# Patient Record
Sex: FEMALE | Race: OTHER | HISPANIC OR LATINO | Employment: STUDENT | URBAN - METROPOLITAN AREA
[De-identification: names, ages, dates, MRNs, and addresses within clinical notes are randomized per-mention and may not be internally consistent; named-entity substitution may affect disease eponyms.]

---
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
## 2024-10-30 ENCOUNTER — HOSPITAL ENCOUNTER (EMERGENCY)
Facility: HOSPITAL | Age: 19
Discharge: HOME | End: 2024-10-30
Attending: EMERGENCY MEDICINE | Admitting: EMERGENCY MEDICINE
Payer: COMMERCIAL

## 2024-10-30 VITALS
HEART RATE: 80 BPM | WEIGHT: 156.75 LBS | DIASTOLIC BLOOD PRESSURE: 79 MMHG | RESPIRATION RATE: 18 BRPM | OXYGEN SATURATION: 98 % | SYSTOLIC BLOOD PRESSURE: 141 MMHG | TEMPERATURE: 98.1 F

## 2024-10-30 DIAGNOSIS — K92.1 BLOOD IN STOOL: Primary | ICD-10-CM

## 2024-10-30 PROCEDURE — 99283 EMERGENCY DEPT VISIT LOW MDM: CPT | Performed by: EMERGENCY MEDICINE

## 2024-10-30 PROCEDURE — 99284 EMERGENCY DEPT VISIT MOD MDM: CPT

## 2024-10-31 NOTE — ED PROVIDER NOTES
"Time reflects when diagnosis was documented in both MDM as applicable and the Disposition within this note       Time User Action Codes Description Comment    10/30/2024 10:43 PM Haresh Aponte Add [K92.1] Blood in stool           ED Disposition       ED Disposition   Discharge    Condition   Stable    Date/Time   Wed Oct 30, 2024 10:43 PM    Comment   Winnie Quezada discharge to home/self care.                   Assessment & Plan       Medical Decision Making  19-year-old female presents with blood in stool  Patient with stable vitals no signs of anemia per history as well as exam  Patient also without any abdominal pain not concerning for any pancreatitis, hepatitis, cholecystitis, appendicitis  Will refer patient to GI for possible colonoscopy    Problems Addressed:  Blood in stool: acute illness or injury             Medications - No data to display    ED Risk Strat Scores             CRAFFT      Flowsheet Row Most Recent Value   CRAFFT Initial Screen: During the past 12 months, did you:    1. Drink any alcohol (more than a few sips)?  Yes Filed at: 10/30/2024 2236   2. Smoke any marijuana or hashish No Filed at: 10/30/2024 2236   3. Use anything else to get high? (\"anything else\" includes illegal drugs, over the counter and prescription drugs, and things that you sniff or 'christian')? No Filed at: 10/30/2024 2236   CRAFFT Full Screen: During the past 12 months:    1. Have you ever ridden in a car driven by someone (including yourself) who was \"high\" or had been using alcohol or drugs? 0 Filed at: 10/30/2024 2236   2. Do you ever use alcohol or drugs to relax, feel better about yourself, or fit in? 0 Filed at: 10/30/2024 2236   3. Do you ever use alcohol/drugs while you are by yourself, alone? 0 Filed at: 10/30/2024 2236   4. Do you ever forget things you did while using alcohol or drugs? 0 Filed at: 10/30/2024 2236   5. Do your family or friends ever tell you that you should cut down on your drinking or drug " "use? 0 Filed at: 10/30/2024 2236   6. Have you gotten into trouble while you were using alcohol or drugs? 0 Filed at: 10/30/2024 2236   CRAFFT Score 0 Filed at: 10/30/2024 2236                                          History of Present Illness       Chief Complaint   Patient presents with    Black or Bloody Stool     Pt states \"I'm having the same problem as last time, they told me I have hemorrhoids inside, and it's been about a month, and I had called my doctor and she told me if it keeps going on to go to the ER.\" Pt reports black stool, and pain after bowel movements.       History reviewed. No pertinent past medical history.   Past Surgical History:   Procedure Laterality Date    TONSILLECTOMY        History reviewed. No pertinent family history.   Social History     Tobacco Use    Smoking status: Some Days     Passive exposure: Yes    Smokeless tobacco: Never    Tobacco comments:     hookah   Substance Use Topics    Alcohol use: Yes     Comment: less than monthly 1-2    Drug use: Never      E-Cigarette/Vaping      E-Cigarette/Vaping Substances      I have reviewed and agree with the history as documented.     HPI  19-year-old female with history of hemorrhoids presenting with bloody stool.  States that she has noted some blood in her stool for the past month or so.  When she talked to her primary care provider she was told to go to the emergency department for evaluation.  Patient denies any lightheadedness, chest pain, shortness of breath.  Patient also reports no weakness, numbness.  Bleeding has not been copious.  Reports no abdominal pain.    Review of Systems   Constitutional:  Negative for chills, diaphoresis, fever and unexpected weight change.   HENT:  Negative for ear pain and sore throat.    Eyes:  Negative for visual disturbance.   Respiratory:  Negative for cough, chest tightness and shortness of breath.    Cardiovascular:  Negative for chest pain and leg swelling.   Gastrointestinal:  Positive " for blood in stool. Negative for abdominal distention, abdominal pain, constipation, diarrhea, nausea and vomiting.   Endocrine: Negative.    Genitourinary:  Negative for difficulty urinating and dysuria.   Musculoskeletal: Negative.    Skin: Negative.    Allergic/Immunologic: Negative.    Neurological: Negative.    Hematological: Negative.    Psychiatric/Behavioral: Negative.     All other systems reviewed and are negative.          Objective       ED Triage Vitals [10/30/24 2234]   Temperature Pulse Blood Pressure Respirations SpO2 Patient Position - Orthostatic VS   98.1 °F (36.7 °C) 80 141/79 18 98 % Sitting      Temp Source Heart Rate Source BP Location FiO2 (%) Pain Score    Oral -- Left arm -- --      Vitals      Date and Time Temp Pulse SpO2 Resp BP Pain Score FACES Pain Rating User   10/30/24 2234 98.1 °F (36.7 °C) 80 98 % 18 141/79 -- -- CO            Physical Exam  Vitals and nursing note reviewed.   Constitutional:       General: She is not in acute distress.     Appearance: Normal appearance. She is not ill-appearing.   HENT:      Head: Normocephalic and atraumatic.      Right Ear: External ear normal.      Left Ear: External ear normal.      Nose: Nose normal.      Mouth/Throat:      Mouth: Mucous membranes are moist.      Pharynx: Oropharynx is clear.   Eyes:      General: No scleral icterus.        Right eye: No discharge.         Left eye: No discharge.      Extraocular Movements: Extraocular movements intact.      Conjunctiva/sclera: Conjunctivae normal.      Pupils: Pupils are equal, round, and reactive to light.   Cardiovascular:      Rate and Rhythm: Normal rate and regular rhythm.      Pulses: Normal pulses.      Heart sounds: Normal heart sounds.   Pulmonary:      Effort: Pulmonary effort is normal.      Breath sounds: Normal breath sounds.   Abdominal:      General: Abdomen is flat. Bowel sounds are normal. There is no distension.      Palpations: Abdomen is soft.      Tenderness: There is no  abdominal tenderness. There is no guarding or rebound.   Musculoskeletal:         General: Normal range of motion.      Cervical back: Normal range of motion and neck supple.   Skin:     General: Skin is warm and dry.      Capillary Refill: Capillary refill takes less than 2 seconds.   Neurological:      General: No focal deficit present.      Mental Status: She is alert and oriented to person, place, and time. Mental status is at baseline.   Psychiatric:         Mood and Affect: Mood normal.         Behavior: Behavior normal.         Thought Content: Thought content normal.         Judgment: Judgment normal.         Results Reviewed       None            No orders to display       Procedures    ED Medication and Procedure Management   Prior to Admission Medications   Prescriptions Last Dose Informant Patient Reported? Taking?   hydrocortisone 1 % cream   No No   Sig: Apply to affected area 2 times daily      Facility-Administered Medications: None     Discharge Medication List as of 10/30/2024 10:44 PM        CONTINUE these medications which have NOT CHANGED    Details   hydrocortisone 1 % cream Apply to affected area 2 times daily, Normal           No discharge procedures on file.  ED SEPSIS DOCUMENTATION   Time reflects when diagnosis was documented in both MDM as applicable and the Disposition within this note       Time User Action Codes Description Comment    10/30/2024 10:43 PM Haresh Aponte Add [K92.1] Blood in stool                  Haresh Aponte MD  10/30/24 0406

## 2025-01-08 ENCOUNTER — HOSPITAL ENCOUNTER (EMERGENCY)
Facility: HOSPITAL | Age: 20
Discharge: HOME | End: 2025-01-08
Attending: EMERGENCY MEDICINE
Payer: COMMERCIAL

## 2025-01-08 VITALS
SYSTOLIC BLOOD PRESSURE: 110 MMHG | HEART RATE: 65 BPM | TEMPERATURE: 97.9 F | WEIGHT: 155.65 LBS | OXYGEN SATURATION: 99 % | DIASTOLIC BLOOD PRESSURE: 65 MMHG | RESPIRATION RATE: 18 BRPM

## 2025-01-08 DIAGNOSIS — N93.9 ABNORMAL VAGINAL BLEEDING: Primary | ICD-10-CM

## 2025-01-08 LAB
ALBUMIN SERPL BCG-MCNC: 4.3 G/DL (ref 3.5–5)
ALP SERPL-CCNC: 56 U/L (ref 34–104)
ALT SERPL W P-5'-P-CCNC: 12 U/L (ref 7–52)
ANION GAP SERPL CALCULATED.3IONS-SCNC: 7 MMOL/L (ref 4–13)
AST SERPL W P-5'-P-CCNC: 15 U/L (ref 13–39)
BACTERIA UR QL AUTO: ABNORMAL /HPF
BASOPHILS # BLD AUTO: 0.02 THOUSANDS/ΜL (ref 0–0.1)
BASOPHILS NFR BLD AUTO: 0 % (ref 0–1)
BILIRUB SERPL-MCNC: 0.4 MG/DL (ref 0.2–1)
BILIRUB UR QL STRIP: NEGATIVE
BUN SERPL-MCNC: 10 MG/DL (ref 5–25)
CALCIUM SERPL-MCNC: 9.3 MG/DL (ref 8.4–10.2)
CHLORIDE SERPL-SCNC: 107 MMOL/L (ref 96–108)
CLARITY UR: CLEAR
CO2 SERPL-SCNC: 26 MMOL/L (ref 21–32)
COLOR UR: ABNORMAL
CREAT SERPL-MCNC: 0.59 MG/DL (ref 0.6–1.3)
EOSINOPHIL # BLD AUTO: 0.06 THOUSAND/ΜL (ref 0–0.61)
EOSINOPHIL NFR BLD AUTO: 1 % (ref 0–6)
ERYTHROCYTE [DISTWIDTH] IN BLOOD BY AUTOMATED COUNT: 12.3 % (ref 11.6–15.1)
EXT PREGNANCY TEST URINE: NEGATIVE
EXT. CONTROL: NORMAL
GFR SERPL CREATININE-BSD FRML MDRD: 131 ML/MIN/1.73SQ M
GLUCOSE SERPL-MCNC: 109 MG/DL (ref 65–140)
GLUCOSE UR STRIP-MCNC: NEGATIVE MG/DL
HCT VFR BLD AUTO: 43.5 % (ref 34.8–46.1)
HGB BLD-MCNC: 14.6 G/DL (ref 11.5–15.4)
HGB UR QL STRIP.AUTO: 250
IMM GRANULOCYTES # BLD AUTO: 0.02 THOUSAND/UL (ref 0–0.2)
IMM GRANULOCYTES NFR BLD AUTO: 0 % (ref 0–2)
KETONES UR STRIP-MCNC: NEGATIVE MG/DL
LEUKOCYTE ESTERASE UR QL STRIP: NEGATIVE
LYMPHOCYTES # BLD AUTO: 2.4 THOUSANDS/ΜL (ref 0.6–4.47)
LYMPHOCYTES NFR BLD AUTO: 36 % (ref 14–44)
MCH RBC QN AUTO: 31.1 PG (ref 26.8–34.3)
MCHC RBC AUTO-ENTMCNC: 33.6 G/DL (ref 31.4–37.4)
MCV RBC AUTO: 93 FL (ref 82–98)
MONOCYTES # BLD AUTO: 0.7 THOUSAND/ΜL (ref 0.17–1.22)
MONOCYTES NFR BLD AUTO: 10 % (ref 4–12)
NEUTROPHILS # BLD AUTO: 3.57 THOUSANDS/ΜL (ref 1.85–7.62)
NEUTS SEG NFR BLD AUTO: 53 % (ref 43–75)
NITRITE UR QL STRIP: NEGATIVE
NON-SQ EPI CELLS URNS QL MICRO: ABNORMAL /HPF
NRBC BLD AUTO-RTO: 0 /100 WBCS
PH UR STRIP.AUTO: 5 [PH]
PLATELET # BLD AUTO: 303 THOUSANDS/UL (ref 149–390)
PMV BLD AUTO: 8.9 FL (ref 8.9–12.7)
POTASSIUM SERPL-SCNC: 3.7 MMOL/L (ref 3.5–5.3)
PROT SERPL-MCNC: 7 G/DL (ref 6.4–8.4)
PROT UR STRIP-MCNC: NEGATIVE MG/DL
RBC # BLD AUTO: 4.7 MILLION/UL (ref 3.81–5.12)
RBC #/AREA URNS AUTO: ABNORMAL /HPF
SODIUM SERPL-SCNC: 140 MMOL/L (ref 135–147)
SP GR UR STRIP.AUTO: 1.01 (ref 1–1.04)
UROBILINOGEN UA: NEGATIVE MG/DL
WBC # BLD AUTO: 6.77 THOUSAND/UL (ref 4.31–10.16)
WBC #/AREA URNS AUTO: ABNORMAL /HPF

## 2025-01-08 PROCEDURE — 99284 EMERGENCY DEPT VISIT MOD MDM: CPT

## 2025-01-08 PROCEDURE — 81001 URINALYSIS AUTO W/SCOPE: CPT | Performed by: PHYSICIAN ASSISTANT

## 2025-01-08 PROCEDURE — 99284 EMERGENCY DEPT VISIT MOD MDM: CPT | Performed by: PHYSICIAN ASSISTANT

## 2025-01-08 PROCEDURE — 36415 COLL VENOUS BLD VENIPUNCTURE: CPT | Performed by: PHYSICIAN ASSISTANT

## 2025-01-08 PROCEDURE — 80053 COMPREHEN METABOLIC PANEL: CPT | Performed by: PHYSICIAN ASSISTANT

## 2025-01-08 PROCEDURE — 85025 COMPLETE CBC W/AUTO DIFF WBC: CPT | Performed by: PHYSICIAN ASSISTANT

## 2025-01-08 PROCEDURE — 81025 URINE PREGNANCY TEST: CPT | Performed by: PHYSICIAN ASSISTANT

## 2025-01-08 RX ORDER — NAPROXEN 500 MG/1
500 TABLET ORAL 2 TIMES DAILY WITH MEALS
Qty: 30 TABLET | Refills: 0 | Status: SHIPPED | OUTPATIENT
Start: 2025-01-08

## 2025-01-08 RX ORDER — NAPROXEN 500 MG/1
500 TABLET ORAL ONCE
Status: COMPLETED | OUTPATIENT
Start: 2025-01-08 | End: 2025-01-08

## 2025-01-08 RX ADMIN — NAPROXEN 500 MG: 500 TABLET ORAL at 01:41

## 2025-01-08 NOTE — ED NOTES
Patient seen by the provider with results of testing and discharge instructions reviewed with patient. Patient reports relief of her pain.     Gertrudis Klein RN  01/08/25 0220

## 2025-01-08 NOTE — DISCHARGE INSTRUCTIONS
Take medications as directed.  Follow-up with GYN.  Call this week to make an appointment.  Return for new or worsening complaints.

## 2025-01-08 NOTE — Clinical Note
Winnie Quezada was seen and treated in our emergency department on 1/8/2025.    No restrictions            Diagnosis:     Winnie  may return to work on return date.    She may return on this date: 01/09/2025         If you have any questions or concerns, please don't hesitate to call.      Mary Bolden PA-C    ______________________________           _______________          _______________  Hospital Representative                              Date                                Time

## 2025-01-08 NOTE — ED PROVIDER NOTES
Time reflects when diagnosis was documented in both MDM as applicable and the Disposition within this note       Time User Action Codes Description Comment    1/8/2025  2:21 AM Mary Bolden Add [N93.9] Abnormal vaginal bleeding           ED Disposition       ED Disposition   Discharge    Condition   Stable    Date/Time   Wed Jan 8, 2025  2:21 AM    Comment   Winnie Quezada discharge to home/self care.                   Assessment & Plan       Medical Decision Making  Patient presenting complaining of vaginal bleeding.  Patient was normotensive on presentation without tachycardia and appeared well.  Moderate bleeding noted on pelvic exam however no profuse or severe bleeding.  Patient had stable hemoglobin at 14.  Pain was completely resolved with naproxen.  Pregnancy test negative.  Low concern for ectopic.  Pain resolved with naproxen, low concern for acute ovarian torsion.  Patient given follow-up with GYN educated on supportive care and return precautions.  Discharged home.    Amount and/or Complexity of Data Reviewed  Labs: ordered.    Risk  Prescription drug management.             Medications   naproxen (NAPROSYN) tablet 500 mg (500 mg Oral Given 1/8/25 0141)       ED Risk Strat Scores                          SBIRT 20yo+      Flowsheet Row Most Recent Value   Initial Alcohol Screen: US AUDIT-C     1. How often do you have a drink containing alcohol? 0 Filed at: 01/08/2025 0112   2. How many drinks containing alcohol do you have on a typical day you are drinking?  0 Filed at: 01/08/2025 0112   3a. Male UNDER 65: How often do you have five or more drinks on one occasion? 0 Filed at: 01/08/2025 0112   3b. FEMALE Any Age, or MALE 65+: How often do you have 4 or more drinks on one occassion? 0 Filed at: 01/08/2025 0112   Audit-C Score 0 Filed at: 01/08/2025 0112   NIKI: How many times in the past year have you...    Used an illegal drug or used a prescription medication for non-medical reasons?  Never Filed at: 01/08/2025 0112                            History of Present Illness       Chief Complaint   Patient presents with    Vaginal Bleeding     Pt reports 4 days of vaginal bleeding with lower abd cramping, changing pad appr. Every 45 minutes.  Pt reports she is appr. 2 weeks late on period.  Does not have OBGYN, took ibuprofen today.        History reviewed. No pertinent past medical history.   Past Surgical History:   Procedure Laterality Date    TONSILLECTOMY        History reviewed. No pertinent family history.   Social History     Tobacco Use    Smoking status: Some Days     Passive exposure: Yes    Smokeless tobacco: Never    Tobacco comments:     hookah   Substance Use Topics    Alcohol use: Yes     Comment: less than monthly 1-2    Drug use: Never      E-Cigarette/Vaping      E-Cigarette/Vaping Substances      I have reviewed and agree with the history as documented.     20-year-old female without significant past medical history presents complaining of lower abdominal cramping and vaginal bleeding.  Patient states that she has been having bleeding for the past 4 days however got worse today with the passage of clots.  Patient states that she is going through a pad approximately every hour and occasionally soaking through pads.  Denies any lightheadedness dizziness or pallor.  Denies history of similar prior bleeding condition.  Patient was recently on birth control which she stopped a few weeks ago.  Patient has had 1 other period  Since stopping birth control. Denies any other complaints       History provided by:  Patient   used: No        Review of Systems   Constitutional: Negative.  Negative for chills and fatigue.   HENT:  Negative for ear pain and sore throat.    Eyes:  Negative for photophobia and redness.   Respiratory:  Negative for apnea, cough and shortness of breath.    Cardiovascular:  Negative for chest pain.   Gastrointestinal:  Negative for abdominal pain,  nausea and vomiting.   Genitourinary:  Positive for vaginal bleeding. Negative for dysuria.   Musculoskeletal:  Negative for arthralgias, neck pain and neck stiffness.   Skin:  Negative for rash.   Neurological:  Negative for dizziness, tremors, syncope and weakness.   Psychiatric/Behavioral:  Negative for suicidal ideas.            Objective       ED Triage Vitals [01/08/25 0100]   Temperature Pulse Blood Pressure Respirations SpO2 Patient Position - Orthostatic VS   97.9 °F (36.6 °C) 87 141/83 18 98 % Sitting      Temp Source Heart Rate Source BP Location FiO2 (%) Pain Score    Tympanic Monitor Left arm -- 6      Vitals      Date and Time Temp Pulse SpO2 Resp BP Pain Score FACES Pain Rating User   01/08/25 0211 -- 65 99 % 18 110/65 No Pain -- EY   01/08/25 0141 -- -- -- -- -- 7 -- EY   01/08/25 0100 97.9 °F (36.6 °C) 87 98 % 18 141/83 6 -- TR            Physical Exam  Constitutional:       General: She is not in acute distress.     Appearance: She is well-developed. She is not diaphoretic.   Eyes:      Pupils: Pupils are equal, round, and reactive to light.   Cardiovascular:      Rate and Rhythm: Normal rate and regular rhythm.   Pulmonary:      Effort: Pulmonary effort is normal. No respiratory distress.      Breath sounds: Normal breath sounds.   Abdominal:      General: Bowel sounds are normal. There is no distension.      Palpations: Abdomen is soft.   Genitourinary:     Comments: No obvious injury to vaginal mucosa or wall.  Bleeding with minor clots noted from the cervical os.  Normal external genitalia.  Musculoskeletal:         General: Normal range of motion.      Cervical back: Normal range of motion and neck supple.   Skin:     General: Skin is warm and dry.   Neurological:      Mental Status: She is alert and oriented to person, place, and time.         Results Reviewed       Procedure Component Value Units Date/Time    Comprehensive metabolic panel [[id number]]  (Abnormal) Collected: 01/08/25 0144     Lab Status: Final result Specimen: Blood from Arm, Left Updated: 01/08/25 0205     Sodium 140 mmol/L      Potassium 3.7 mmol/L      Chloride 107 mmol/L      CO2 26 mmol/L      ANION GAP 7 mmol/L      BUN 10 mg/dL      Creatinine 0.59 mg/dL      Glucose 109 mg/dL      Calcium 9.3 mg/dL      AST 15 U/L      ALT 12 U/L      Alkaline Phosphatase 56 U/L      Total Protein 7.0 g/dL      Albumin 4.3 g/dL      Total Bilirubin 0.40 mg/dL      eGFR 131 ml/min/1.73sq m     Narrative:      National Kidney Disease Foundation guidelines for Chronic Kidney Disease (CKD):     Stage 1 with normal or high GFR (GFR > 90 mL/min/1.73 square meters)    Stage 2 Mild CKD (GFR = 60-89 mL/min/1.73 square meters)    Stage 3A Moderate CKD (GFR = 45-59 mL/min/1.73 square meters)    Stage 3B Moderate CKD (GFR = 30-44 mL/min/1.73 square meters)    Stage 4 Severe CKD (GFR = 15-29 mL/min/1.73 square meters)    Stage 5 End Stage CKD (GFR <15 mL/min/1.73 square meters)  Note: GFR calculation is accurate only with a steady state creatinine    CBC and differential [[id number]] Collected: 01/08/25 0144    Lab Status: Final result Specimen: Blood from Arm, Left Updated: 01/08/25 0151     WBC 6.77 Thousand/uL      RBC 4.70 Million/uL      Hemoglobin 14.6 g/dL      Hematocrit 43.5 %      MCV 93 fL      MCH 31.1 pg      MCHC 33.6 g/dL      RDW 12.3 %      MPV 8.9 fL      Platelets 303 Thousands/uL      nRBC 0 /100 WBCs      Segmented % 53 %      Immature Grans % 0 %      Lymphocytes % 36 %      Monocytes % 10 %      Eosinophils Relative 1 %      Basophils Relative 0 %      Absolute Neutrophils 3.57 Thousands/µL      Absolute Immature Grans 0.02 Thousand/uL      Absolute Lymphocytes 2.40 Thousands/µL      Absolute Monocytes 0.70 Thousand/µL      Eosinophils Absolute 0.06 Thousand/µL      Basophils Absolute 0.02 Thousands/µL     Urine Microscopic [[id number]]  (Abnormal) Collected: 01/08/25 0118    Lab Status: Final result Specimen: Urine, Clean Catch Updated:  01/08/25 0132     RBC, UA 10-20 /hpf      WBC, UA None Seen /hpf      Epithelial Cells Occasional /hpf      Bacteria, UA None Seen /hpf     UA (URINE) with reflex to Scope [[id number]]  (Abnormal) Collected: 01/08/25 0118    Lab Status: Final result Specimen: Urine, Clean Catch Updated: 01/08/25 0129     Color, UA Straw     Clarity, UA Clear     Specific Gravity, UA 1.010     pH, UA 5.0     Leukocytes, UA Negative     Nitrite, UA Negative     Protein, UA Negative mg/dl      Glucose, UA Negative mg/dl      Ketones, UA Negative mg/dl      Bilirubin, UA Negative     Occult Blood, .0     UROBILINOGEN UA Negative mg/dL     POCT pregnancy, urine [[id number]]  (Normal) Collected: 01/08/25 0122    Lab Status: Final result Updated: 01/08/25 0123     EXT Preg Test, Ur Negative     Control Valid            No orders to display       Procedures    ED Medication and Procedure Management   Prior to Admission Medications   Prescriptions Last Dose Informant Patient Reported? Taking?   hydrocortisone 1 % cream Not Taking  No No   Sig: Apply to affected area 2 times daily   Patient not taking: Reported on 1/8/2025      Facility-Administered Medications: None     Patient's Medications   Discharge Prescriptions    NAPROXEN (NAPROSYN) 500 MG TABLET    Take 1 tablet (500 mg total) by mouth 2 (two) times a day with meals       Start Date: 1/8/2025  End Date: --       Order Dose: 500 mg       Quantity: 30 tablet    Refills: 0     No discharge procedures on file.  ED SEPSIS DOCUMENTATION   Time reflects when diagnosis was documented in both MDM as applicable and the Disposition within this note       Time User Action Codes Description Comment    1/8/2025  2:21 AM Mary Bolden Add [N93.9] Abnormal vaginal bleeding                  Mary Bolden PA-C  01/08/25 0223

## 2025-01-12 ENCOUNTER — TELEPHONE (OUTPATIENT)
Dept: OTHER | Facility: OTHER | Age: 20
End: 2025-01-12

## 2025-01-12 NOTE — TELEPHONE ENCOUNTER
Patient calling post emergency room visit to schedule follow-up appointment. Please call patient to schedule

## 2025-01-13 NOTE — TELEPHONE ENCOUNTER
Called PT to try to schedule appointment but PT has a NON [employer] Insurance and she will contact other facility to schedule f/u appointment.

## 2025-06-12 ENCOUNTER — OFFICE VISIT (OUTPATIENT)
Dept: URGENT CARE | Facility: MEDICAL CENTER | Age: 20
End: 2025-06-12
Payer: COMMERCIAL

## 2025-06-12 VITALS
HEART RATE: 68 BPM | TEMPERATURE: 99.9 F | OXYGEN SATURATION: 99 % | RESPIRATION RATE: 18 BRPM | DIASTOLIC BLOOD PRESSURE: 72 MMHG | SYSTOLIC BLOOD PRESSURE: 119 MMHG

## 2025-06-12 DIAGNOSIS — R30.0 DYSURIA: Primary | ICD-10-CM

## 2025-06-12 LAB
SL AMB  POCT GLUCOSE, UA: NEGATIVE
SL AMB LEUKOCYTE ESTERASE,UA: ABNORMAL
SL AMB POCT BILIRUBIN,UA: NEGATIVE
SL AMB POCT BLOOD,UA: NEGATIVE
SL AMB POCT CLARITY,UA: CLEAR
SL AMB POCT COLOR,UA: YELLOW
SL AMB POCT KETONES,UA: NEGATIVE
SL AMB POCT NITRITE,UA: NEGATIVE
SL AMB POCT PH,UA: 8
SL AMB POCT SPECIFIC GRAVITY,UA: 1
SL AMB POCT URINE PROTEIN: NEGATIVE
SL AMB POCT UROBILINOGEN: 0.2

## 2025-06-12 PROCEDURE — 81002 URINALYSIS NONAUTO W/O SCOPE: CPT | Performed by: NURSE PRACTITIONER

## 2025-06-12 PROCEDURE — 99203 OFFICE O/P NEW LOW 30 MIN: CPT | Performed by: NURSE PRACTITIONER

## 2025-06-12 RX ORDER — SULFAMETHOXAZOLE AND TRIMETHOPRIM 800; 160 MG/1; MG/1
1 TABLET ORAL EVERY 12 HOURS SCHEDULED
Qty: 10 TABLET | Refills: 0 | Status: SHIPPED | OUTPATIENT
Start: 2025-06-12 | End: 2025-06-17

## 2025-06-12 NOTE — PROGRESS NOTES
[facility]        NAME: Winnie Quezada is a 20 y.o. female  : 2005    MRN: [id number]  DATE: 2025  TIME: 6:31 PM    Assessment and Plan   Dysuria [R30.0]  1. Dysuria  POCT urine dip    Urine culture    Urine culture    sulfamethoxazole-trimethoprim (BACTRIM DS) 800-160 mg per tablet        Patient in NAD and VSS upon exam. Discussed results of UA in office. Will start abx for likely UTI and send culture. Will call patient with results if treatment needs to be changed.  Discussed supportive care and return precautions. Note for work/school given as needed.      Patient Instructions       Follow up with PCP in 3-5 days.  Proceed to  ER if symptoms worsen.    If tests have been performed at [facility], our office will contact you with results if changes need to be made to the care plan discussed with you at the visit.  You can review your full results on [facility]    Chief Complaint     Chief Complaint   Patient presents with   • Possible UTI     Patient reports symptoms started 2 weeks ago with some discomfort with urination that seemed to go away and came back 3 days ago. Now has urinary urgency with little urine flow and frequency with discomfort         History of Present Illness       Started: 2 weeks ago resolved but returned over past 3 days  Positive: dysuria, frequency, urgency, going smaller amounts  Negative: hematuria, flank pain, back pain, fever, vaginal d/c, abdominal pain  No concern for pregnancy or STI's  Treatment: none        Review of Systems   Review of Systems   Constitutional:  Negative for fatigue and fever.   Gastrointestinal:  Negative for abdominal pain.   Genitourinary:  Positive for dysuria, frequency and urgency. Negative for flank pain and hematuria.   Musculoskeletal:  Negative for back pain.         Current Medications     Current Medications[1]    Current Allergies     Allergies as of 2025   • (No Known Allergies)            The  following portions of the patient's history were reviewed and updated as appropriate: allergies, current medications, past family history, past medical history, past social history, past surgical history and problem list.     Past Medical History[2]    Past Surgical History[3]    Family History[4]      Medications have been verified.        Objective   /72   Pulse 68   Temp 99.9 °F (37.7 °C)   Resp 18   SpO2 99%   No LMP recorded.       Physical Exam     Physical Exam  Constitutional:       Appearance: Normal appearance.   HENT:      Head: Normocephalic and atraumatic.     Cardiovascular:      Rate and Rhythm: Normal rate.   Pulmonary:      Effort: Pulmonary effort is normal.   Abdominal:      General: Abdomen is flat. Bowel sounds are normal.      Palpations: Abdomen is soft.      Tenderness: There is no abdominal tenderness. There is no right CVA tenderness or left CVA tenderness.     Musculoskeletal:         General: Normal range of motion.     Skin:     General: Skin is warm and dry.     Neurological:      Mental Status: She is alert and oriented to person, place, and time.                          [1]    Current Outpatient Medications:   •  sulfamethoxazole-trimethoprim (BACTRIM DS) 800-160 mg per tablet, Take 1 tablet by mouth every 12 (twelve) hours for 5 days, Disp: 10 tablet, Rfl: 0  •  hydrocortisone 1 % cream, Apply to affected area 2 times daily (Patient not taking: Reported on 6/12/2025), Disp: 15 g, Rfl: 0  •  naproxen (Naprosyn) 500 mg tablet, Take 1 tablet (500 mg total) by mouth 2 (two) times a day with meals (Patient not taking: Reported on 6/12/2025), Disp: 30 tablet, Rfl: 0[2]  No past medical history on file.[3]  Past Surgical History:  Procedure Laterality Date   • TONSILLECTOMY     [4]  No family history on file.

## 2025-06-15 LAB
BACTERIA UR CULT: ABNORMAL
Lab: ABNORMAL